# Patient Record
Sex: FEMALE | Race: WHITE | Employment: OTHER | URBAN - METROPOLITAN AREA
[De-identification: names, ages, dates, MRNs, and addresses within clinical notes are randomized per-mention and may not be internally consistent; named-entity substitution may affect disease eponyms.]

---

## 2022-08-17 ENCOUNTER — ANESTHESIA (OUTPATIENT)
Dept: OPERATING ROOM | Age: 67
End: 2022-08-17
Payer: MEDICARE

## 2022-08-17 ENCOUNTER — ANESTHESIA EVENT (OUTPATIENT)
Dept: OPERATING ROOM | Age: 67
End: 2022-08-17
Payer: MEDICARE

## 2022-08-17 ENCOUNTER — HOSPITAL ENCOUNTER (OUTPATIENT)
Age: 67
Setting detail: OUTPATIENT SURGERY
Discharge: HOME | End: 2022-08-17
Attending: INTERNAL MEDICINE | Admitting: INTERNAL MEDICINE
Payer: MEDICARE

## 2022-08-17 VITALS
OXYGEN SATURATION: 98 % | HEART RATE: 67 BPM | RESPIRATION RATE: 14 BRPM | HEIGHT: 62 IN | DIASTOLIC BLOOD PRESSURE: 89 MMHG | WEIGHT: 180 LBS | TEMPERATURE: 97 F | SYSTOLIC BLOOD PRESSURE: 138 MMHG | BODY MASS INDEX: 33.13 KG/M2

## 2022-08-17 DIAGNOSIS — R13.10 DYSPHAGIA, UNSPECIFIED TYPE: ICD-10-CM

## 2022-08-17 PROCEDURE — 3700000001 HC ADD 15 MINUTES (ANESTHESIA): Performed by: INTERNAL MEDICINE

## 2022-08-17 PROCEDURE — 7100000010 HC PHASE II RECOVERY - FIRST 15 MIN: Performed by: INTERNAL MEDICINE

## 2022-08-17 PROCEDURE — 7100000011 HC PHASE II RECOVERY - ADDTL 15 MIN: Performed by: INTERNAL MEDICINE

## 2022-08-17 PROCEDURE — 2709999900 HC NON-CHARGEABLE SUPPLY: Performed by: INTERNAL MEDICINE

## 2022-08-17 PROCEDURE — 88305 TISSUE EXAM BY PATHOLOGIST: CPT

## 2022-08-17 PROCEDURE — 2580000003 HC RX 258: Performed by: ANESTHESIOLOGY

## 2022-08-17 PROCEDURE — 6360000002 HC RX W HCPCS: Performed by: NURSE ANESTHETIST, CERTIFIED REGISTERED

## 2022-08-17 PROCEDURE — 3609012400 HC EGD TRANSORAL BIOPSY SINGLE/MULTIPLE: Performed by: INTERNAL MEDICINE

## 2022-08-17 PROCEDURE — 3700000000 HC ANESTHESIA ATTENDED CARE: Performed by: INTERNAL MEDICINE

## 2022-08-17 PROCEDURE — 2500000003 HC RX 250 WO HCPCS: Performed by: NURSE ANESTHETIST, CERTIFIED REGISTERED

## 2022-08-17 RX ORDER — SODIUM CHLORIDE 0.9 % (FLUSH) 0.9 %
5-40 SYRINGE (ML) INJECTION PRN
Status: DISCONTINUED | OUTPATIENT
Start: 2022-08-17 | End: 2022-08-17 | Stop reason: HOSPADM

## 2022-08-17 RX ORDER — OMEPRAZOLE 20 MG/1
20 CAPSULE, DELAYED RELEASE ORAL DAILY
COMMUNITY

## 2022-08-17 RX ORDER — SODIUM CHLORIDE 9 MG/ML
INJECTION, SOLUTION INTRAVENOUS PRN
Status: DISCONTINUED | OUTPATIENT
Start: 2022-08-17 | End: 2022-08-17 | Stop reason: HOSPADM

## 2022-08-17 RX ORDER — ASPIRIN 81 MG/1
81 TABLET, CHEWABLE ORAL DAILY
COMMUNITY
Start: 2022-06-29

## 2022-08-17 RX ORDER — PROPOFOL 10 MG/ML
INJECTION, EMULSION INTRAVENOUS PRN
Status: DISCONTINUED | OUTPATIENT
Start: 2022-08-17 | End: 2022-08-17 | Stop reason: SDUPTHER

## 2022-08-17 RX ORDER — LIDOCAINE HYDROCHLORIDE 20 MG/ML
INJECTION, SOLUTION INFILTRATION; PERINEURAL PRN
Status: DISCONTINUED | OUTPATIENT
Start: 2022-08-17 | End: 2022-08-17 | Stop reason: SDUPTHER

## 2022-08-17 RX ORDER — SODIUM CHLORIDE, SODIUM LACTATE, POTASSIUM CHLORIDE, CALCIUM CHLORIDE 600; 310; 30; 20 MG/100ML; MG/100ML; MG/100ML; MG/100ML
INJECTION, SOLUTION INTRAVENOUS CONTINUOUS
Status: DISCONTINUED | OUTPATIENT
Start: 2022-08-17 | End: 2022-08-17 | Stop reason: HOSPADM

## 2022-08-17 RX ORDER — LIDOCAINE HYDROCHLORIDE 10 MG/ML
1 INJECTION, SOLUTION EPIDURAL; INFILTRATION; INTRACAUDAL; PERINEURAL
Status: DISCONTINUED | OUTPATIENT
Start: 2022-08-17 | End: 2022-08-17 | Stop reason: HOSPADM

## 2022-08-17 RX ORDER — PRASUGREL 10 MG/1
TABLET, FILM COATED ORAL
COMMUNITY
Start: 2022-07-26

## 2022-08-17 RX ORDER — METOPROLOL SUCCINATE 25 MG/1
TABLET, EXTENDED RELEASE ORAL
COMMUNITY
Start: 2022-07-26

## 2022-08-17 RX ORDER — ATORVASTATIN CALCIUM 80 MG/1
TABLET, FILM COATED ORAL
COMMUNITY
Start: 2022-07-26

## 2022-08-17 RX ORDER — SODIUM CHLORIDE 0.9 % (FLUSH) 0.9 %
5-40 SYRINGE (ML) INJECTION EVERY 12 HOURS SCHEDULED
Status: DISCONTINUED | OUTPATIENT
Start: 2022-08-17 | End: 2022-08-17 | Stop reason: HOSPADM

## 2022-08-17 RX ADMIN — PROPOFOL 50 MG: 10 INJECTION, EMULSION INTRAVENOUS at 09:36

## 2022-08-17 RX ADMIN — PROPOFOL 30 MG: 10 INJECTION, EMULSION INTRAVENOUS at 09:33

## 2022-08-17 RX ADMIN — PROPOFOL 50 MG: 10 INJECTION, EMULSION INTRAVENOUS at 09:41

## 2022-08-17 RX ADMIN — SODIUM CHLORIDE, POTASSIUM CHLORIDE, SODIUM LACTATE AND CALCIUM CHLORIDE: 600; 310; 30; 20 INJECTION, SOLUTION INTRAVENOUS at 09:17

## 2022-08-17 RX ADMIN — PROPOFOL 20 MG: 10 INJECTION, EMULSION INTRAVENOUS at 09:03

## 2022-08-17 RX ADMIN — LIDOCAINE HYDROCHLORIDE 100 MG: 20 INJECTION, SOLUTION INFILTRATION; PERINEURAL at 09:32

## 2022-08-17 RX ADMIN — PROPOFOL 70 MG: 10 INJECTION, EMULSION INTRAVENOUS at 09:32

## 2022-08-17 ASSESSMENT — PAIN - FUNCTIONAL ASSESSMENT: PAIN_FUNCTIONAL_ASSESSMENT: 0-10

## 2022-08-17 NOTE — ANESTHESIA POSTPROCEDURE EVALUATION
Department of Anesthesiology  Postprocedure Note    Patient: Chemo Vanegas  MRN: [id number]  YOB: 1955  Date of evaluation: 8/17/2022      Procedure Summary     Date: 08/17/22 Room / Location: [place] [address] / [facility] - INPATIENT    Anesthesia Start: 3558 Anesthesia Stop: 3293    Procedure: EGD BIOPSY (Esophagus) Diagnosis:       Dysphagia, unspecified type      (Dysphagia, unspecified type [R13.10])    Surgeons: Lesli Orourke MD Responsible Provider: Noris Peng MD    Anesthesia Type: MAC ASA Status: 4          Anesthesia Type: No value filed.     Raudel Phase I:      Raudel Phase II: Raudel Score: 10      Anesthesia Post Evaluation    Patient location during evaluation: PACU  Patient participation: complete - patient participated  Level of consciousness: awake  Airway patency: patent  Nausea & Vomiting: no nausea and no vomiting  Complications: no  Cardiovascular status: hemodynamically stable  Respiratory status: acceptable  Hydration status: stable  Multimodal analgesia pain management approach

## 2022-08-17 NOTE — DISCHARGE INSTRUCTIONS
Upper GI Endoscopy: What to Expect at [address] may have a sore throat for a day or two after the test.  How can you care for yourself at home? Activity  Rest as much as you need to after you go home. You should be able to go back to your usual activities the day after the test.  Diet  Drink plenty of fluids (unless your doctor has told you not to). Follow-up care is a key part of your treatment and safety. Be sure to make and go to all appointments, and call your doctor if you are having problems. When should you call for help? Call 911 anytime you think you may need emergency care. For example, call if:  You passed out (lost consciousness). You cough up blood. You vomit blood or what looks like coffee grounds. You pass maroon or very bloody stools. Call your doctor now or seek immediate medical care if:  You have trouble swallowing. You have belly pain. Your stools are black and tarlike or have streaks of blood. You are sick to your stomach or cannot keep fluids down. Watch closely for changes in your health, and be sure to contact your doctor if:  Your throat still hurts after a day or two. You do not get better as expected. Where can you learn more? Go to [email or web address] and sign in to your [employer] account. Enter Y436 in the [facility] box to learn more about Upper GI Endoscopy: What to Expect at Home.     .     © 3893-1955 Healthwise, Incorporated. Care instructions adapted under license by [facility]. This care instruction is for use with your licensed healthcare professional. If you have questions about a medical condition or this instruction, always ask your healthcare professional. [place] [address] any warranty or liability for your use of this information.   Content Version: 7.3.013369; Last Revised: February 20, 2013

## 2022-08-17 NOTE — ANESTHESIA PRE PROCEDURE
Department of Anesthesiology  Preprocedure Note       Name:  Angel Case   Age:  74 y.o.  :  1955                                          MRN:  [id number]         Date:  2022      Surgeon: Eden Alas): Serafin Jack MD    Procedure: Procedure(s):  EGD ESOPHAGOGASTRODUODENOSCOPY    Medications prior to admission:   Prior to Admission medications    Medication Sig Start Date End Date Taking? Authorizing Provider   aspirin 81 MG chewable tablet Take 81 mg by mouth daily 22  Yes Historical Provider, MD   atorvastatin (LIPITOR) 80 MG tablet take 1 tablet by mouth nightly 22   Historical Provider, MD   metoprolol succinate (TOPROL XL) 25 MG extended release tablet take 1 tablet by mouth every morning 22   Historical Provider, MD   omeprazole (PRILOSEC) 20 MG delayed release capsule Take 20 mg by mouth daily    Historical Provider, MD   prasugrel (EFFIENT) 10 MG TABS take 1 tablet by mouth every morning 22   Historical Provider, MD       Current medications:    Current Facility-Administered Medications   Medication Dose Route Frequency Provider Last Rate Last Admin    lidocaine PF 1 % injection 1 mL  1 mL IntraDERmal Once PRN Omar Cheung MD        lactated ringers infusion   IntraVENous Continuous Omar Cheung MD        sodium chloride flush 0.9 % injection 5-40 mL  5-40 mL IntraVENous 2 times per day Omar Cheung MD        sodium chloride flush 0.9 % injection 5-40 mL  5-40 mL IntraVENous PRN Omar Cheung MD        0.9 % sodium chloride infusion   IntraVENous PRN Omar Cheung MD           Allergies:  No Known Allergies    Problem List:  There is no problem list on file for this patient. Past Medical History:  No past medical history on file. Past Surgical History:  No past surgical history on file.     Social History:    Social History     Tobacco Use    Smoking status: Not on file    Smokeless tobacco: Not on file   Substance Use Topics    Alcohol use: Not on file Counseling given: Not Answered      Vital Signs (Current):   Vitals:    08/17/22 0856 08/17/22 0858   BP:  (!) 168/83   Pulse:  73   Resp:  20   Temp:  97.7 °F (36.5 °C)   TempSrc:  Temporal   SpO2:  99%   Weight: 180 lb (81.6 kg)    Height: 5' 2\" (1.575 m)                                               BP Readings from Last 3 Encounters:   08/17/22 (!) 168/83       NPO Status:                                                                                 BMI:   Wt Readings from Last 3 Encounters:   08/17/22 180 lb (81.6 kg)     Body mass index is 32.92 kg/m². CBC: No results found for: WBC, RBC, HGB, HCT, MCV, RDW, PLT    CMP: No results found for: NA, K, CL, CO2, BUN, CREATININE, GFRAA, AGRATIO, LABGLOM, GLUCOSE, GLU, PROT, CALCIUM, BILITOT, ALKPHOS, AST, ALT    POC Tests: No results for input(s): POCGLU, POCNA, POCK, POCCL, POCBUN, POCHEMO, POCHCT in the last 72 hours.     Coags: No results found for: PROTIME, INR, APTT    HCG (If Applicable): No results found for: PREGTESTUR, PREGSERUM, HCG, HCGQUANT     ABGs: No results found for: PHART, PO2ART, JYK1ROI, VBI5ARK, BEART, P4VIMPVR     Type & Screen (If Applicable):  No results found for: LABABO, LABRH    Drug/Infectious Status (If Applicable):  No results found for: HIV, HEPCAB    COVID-19 Screening (If Applicable): No results found for: COVID19        Anesthesia Evaluation  Patient summary reviewed no history of anesthetic complications:   Airway: Mallampati: II          Dental:    (+) upper dentures      Pulmonary:       (-) COPD and asthma                           Cardiovascular:  Exercise tolerance: good (>4 METS),   (+) past MI: 1-6 months, CABG/stent (Stent 07/2022, continued DAPT): no interval change,     (-) valvular problems/murmurs, dysrhythmias and  angina              Cleared by cardiology     Beta Blocker:  Dose within 24 Hrs         Neuro/Psych:      (-) seizures and CVA           GI/Hepatic/Renal:   (+) GERD:, liver

## 2022-08-17 NOTE — OP NOTE
Operative Note      Patient: Carl Earl  YOB: 1955  MRN: [id number]    Date of Procedure: 8/17/2022    Pre-Op Diagnosis: Dysphagia, unspecified type [R13.10]    Indication for the procedure: Patient is a pleasant 74years old female who has been complaining of dysphagia to solid food. She had a barium esophagram that showed distal esophageal stricture. The barium pill could not be passed. She is scheduled for EGD for further evaluation. Post-Op Diagnosis: Benign distal esophageal stricture. This could not be transversed with the regular EGD scope. Pediatric EGD scope was used. No malignancy was found. Dilation could not be performed as patient had recently a heart attack 1-1/2 months ago and she is currently on aspirin and Effient. Procedure(s):  EGD BIOPSY    Surgeon(s): Tawanda Matias MD    Assistant:   * No surgical staff found *    Informed consent: Risks, benefits, and alternatives of the procedure were explained to the patient prior to the procedure. Risks include but not limited to bleeding, perforation, aspiration, allergic reaction to medication or death. Patient was also informed about the risk of missing a lesion. Anesthesia: Monitor Anesthesia Care    Estimated Blood Loss (mL): Minimal    Complications: None    Specimens:   ID Type Source Tests Collected by Time Destination   A : PROXIMAL ESOPHAGUS BIOPSY Tissue Esophagus SURGICAL PATHOLOGY Tawanda Matias MD 8/17/2022 0354        Implants:  * No implants in log *      Drains: * No LDAs found *    Findings: 1-  Benign distal esophageal stricture. This could not be transversed with the regular EGD scope. Pediatric EGD scope was used. No malignancy was found. Dilation could not be performed as patient had recently a heart attack 1-1/2 months ago and she is currently on aspirin and Effient.     2-patchy hyperemia of the stomach as well as retained bilious fluid in the stomach    Detailed Description of Procedure: Patient was placed in the left lateral decubitus position. The well-lubricated Olympus EGD scope was passed through the bite-block was advanced into esophagus. Esophageal mucosa of the upper middle esophagus appeared normal.  Biopsies were taken to rule out eosinophilic esophagitis. The scope was then advanced into the distal esophagus. There was a benign-appearing esophageal stricture. The stricture could not be transversed with the regular EGD scope. I could not perform dilation as patient is on blood thinner for recent heart attack. Subsequently the pediatric EGD scope was passed down into the distal esophagus. The stricture could not be passed. There was patchy hyperemia of the stomach. I could not perform a biopsy as there was no biopsy forceps available for the pediatric scope. There was retained bile in the stomach. Retroflexion was done. There was a small hiatal hernia noted. No malignancy was seen. The scope was then brought back into forward view. Pylorus was intubated. Duodenal bulb and second portion duodenum were seen and appeared normal.  The scope was then removed outside the patient. Plan: 1. Follow up on results of pathology  2-we will refer patient to a tertiary center  3-continue with liquid diet and puréed diet.     Electronically signed by Sera Pimentel MD on 8/17/2022 at 9:46 AM

## 2022-08-17 NOTE — H&P
Allergies:     Patient has no known allergies. Social History:     Tobacco:    reports that she has quit smoking. Her smoking use included cigarettes. She has never used smokeless tobacco.  Alcohol:      reports current alcohol use. Drug Use:  reports no history of drug use. Family History:     History reviewed. No pertinent family history. Review of Systems:     Positive and Negative as described in HPI. CONSTITUTIONAL:  negative for fevers, chills, sweats, fatigue, weight loss  HEENT:  negative for vision, hearing changes, runny nose, throat pain  RESPIRATORY:  negative for shortness of breath, cough, congestion, wheezing. CARDIOVASCULAR:  negative for chest pain, palpitations. GASTROINTESTINAL:  negative for nausea, vomiting, diarrhea, constipation, change in bowel habits, abdominal pain   GENITOURINARY:  negative for difficulty of urination, burning with urination, frequency   INTEGUMENT:  negative for rash, skin lesions, easy bruising   HEMATOLOGIC/LYMPHATIC:  negative for swelling/edema   ALLERGIC/IMMUNOLOGIC:  negative for urticaria , itching  ENDOCRINE:  negative increase in drinking, increase in urination, hot or cold intolerance  MUSCULOSKELETAL:  negative joint pains, muscle aches, swelling of joints  NEUROLOGICAL:  negative for headaches, dizziness, lightheadedness, numbness, pain, tingling extremities  BEHAVIOR/PSYCH:  negative for depression, anxiety    Physical Exam:   BP (!) 168/83   Pulse 73   Temp 97.7 °F (36.5 °C) (Temporal)   Resp 20   Ht 5' 2\" (1.575 m)   Wt 180 lb (81.6 kg)   SpO2 99%   BMI 32.92 kg/m²   No LMP recorded. Patient is postmenopausal.  No obstetric history on file. No results for input(s): POCGLU in the last 72 hours. General Appearance:  alert, well appearing, and in no acute distress  Mental status: oriented to person, place, and time with normal affect  Head:  normocephalic, atraumatic.   Eye: no icterus, redness, pupils equal and reactive, extraocular eye movements intact, conjunctiva clear  Ear: normal external ear, no discharge, hearing intact  Nose:  no drainage noted  Mouth: mucous membranes moist  Neck: supple, no carotid bruits, thyroid not palpable  Lungs: Bilateral equal air entry, clear to ausculation, no wheezing, rales or rhonchi, normal effort  Cardiovascular: normal rate, regular rhythm, no murmur, gallop, rub. Abdomen: Soft, nontender, nondistended, normal bowel sounds, no hepatomegaly or splenomegaly  Neurologic: There are no new focal motor or sensory deficits, normal muscle tone and bulk, no abnormal sensation, normal speech, cranial nerves II through XII grossly intact  Skin: No gross lesions, rashes, bruising or bleeding on exposed skin area  Extremities:  peripheral pulses palpable, no pedal edema or calf pain with palpation  Psych: normal affect     Investigations:      Laboratory Testing:  No results found for this or any previous visit (from the past 24 hour(s)). No results for input(s): HGB, HCT, WBC, MCV, PLATELET, NA, K, CL, CO2, BUN, CREATININE, GLUCOSE, INR, PROTIME, APTT, AST, ALT, LABALBU, HCG in the last 720 hours. No results for input(s): COVID19 in the last 720 hours. Imaging/Diagnostics:        Diagnosis:      1. Dysphagia    Plans:     1.  EGD   ANA Hampton CNP  Electronically signed 8/17/2022 at 9:19 AM

## 2022-08-18 LAB — SURGICAL PATHOLOGY REPORT: NORMAL

## 2022-12-20 ENCOUNTER — ANESTHESIA EVENT (OUTPATIENT)
Dept: OPERATING ROOM | Age: 67
End: 2022-12-20
Payer: MEDICARE

## 2022-12-21 ENCOUNTER — HOSPITAL ENCOUNTER (OUTPATIENT)
Age: 67
Setting detail: OUTPATIENT SURGERY
Discharge: HOME | End: 2022-12-21
Attending: INTERNAL MEDICINE | Admitting: INTERNAL MEDICINE
Payer: MEDICARE

## 2022-12-21 ENCOUNTER — ANESTHESIA (OUTPATIENT)
Dept: OPERATING ROOM | Age: 67
End: 2022-12-21
Payer: MEDICARE

## 2022-12-21 VITALS
SYSTOLIC BLOOD PRESSURE: 118 MMHG | WEIGHT: 185 LBS | DIASTOLIC BLOOD PRESSURE: 77 MMHG | HEART RATE: 57 BPM | BODY MASS INDEX: 34.04 KG/M2 | RESPIRATION RATE: 17 BRPM | TEMPERATURE: 97.5 F | HEIGHT: 62 IN | OXYGEN SATURATION: 100 %

## 2022-12-21 DIAGNOSIS — R13.10 DYSPHAGIA, UNSPECIFIED TYPE: ICD-10-CM

## 2022-12-21 PROCEDURE — 88305 TISSUE EXAM BY PATHOLOGIST: CPT

## 2022-12-21 PROCEDURE — 2580000003 HC RX 258: Performed by: STUDENT IN AN ORGANIZED HEALTH CARE EDUCATION/TRAINING PROGRAM

## 2022-12-21 PROCEDURE — 2500000003 HC RX 250 WO HCPCS: Performed by: NURSE ANESTHETIST, CERTIFIED REGISTERED

## 2022-12-21 PROCEDURE — 3700000001 HC ADD 15 MINUTES (ANESTHESIA): Performed by: INTERNAL MEDICINE

## 2022-12-21 PROCEDURE — 6360000002 HC RX W HCPCS: Performed by: NURSE ANESTHETIST, CERTIFIED REGISTERED

## 2022-12-21 PROCEDURE — 3700000000 HC ANESTHESIA ATTENDED CARE: Performed by: INTERNAL MEDICINE

## 2022-12-21 PROCEDURE — 2709999900 HC NON-CHARGEABLE SUPPLY: Performed by: INTERNAL MEDICINE

## 2022-12-21 PROCEDURE — 3609017700 HC EGD DILATION GASTRIC/DUODENAL STRICTURE: Performed by: INTERNAL MEDICINE

## 2022-12-21 PROCEDURE — 7100000010 HC PHASE II RECOVERY - FIRST 15 MIN: Performed by: INTERNAL MEDICINE

## 2022-12-21 PROCEDURE — C1726 CATH, BAL DIL, NON-VASCULAR: HCPCS | Performed by: INTERNAL MEDICINE

## 2022-12-21 PROCEDURE — 7100000011 HC PHASE II RECOVERY - ADDTL 15 MIN: Performed by: INTERNAL MEDICINE

## 2022-12-21 RX ORDER — LIDOCAINE HYDROCHLORIDE 20 MG/ML
INJECTION, SOLUTION EPIDURAL; INFILTRATION; INTRACAUDAL; PERINEURAL PRN
Status: DISCONTINUED | OUTPATIENT
Start: 2022-12-21 | End: 2022-12-21 | Stop reason: SDUPTHER

## 2022-12-21 RX ORDER — PROPOFOL 10 MG/ML
INJECTION, EMULSION INTRAVENOUS CONTINUOUS PRN
Status: DISCONTINUED | OUTPATIENT
Start: 2022-12-21 | End: 2022-12-21 | Stop reason: SDUPTHER

## 2022-12-21 RX ORDER — SODIUM CHLORIDE 9 MG/ML
INJECTION, SOLUTION INTRAVENOUS CONTINUOUS
Status: DISCONTINUED | OUTPATIENT
Start: 2022-12-21 | End: 2022-12-21 | Stop reason: HOSPADM

## 2022-12-21 RX ORDER — SODIUM CHLORIDE, SODIUM LACTATE, POTASSIUM CHLORIDE, CALCIUM CHLORIDE 600; 310; 30; 20 MG/100ML; MG/100ML; MG/100ML; MG/100ML
INJECTION, SOLUTION INTRAVENOUS CONTINUOUS
Status: DISCONTINUED | OUTPATIENT
Start: 2022-12-21 | End: 2022-12-21 | Stop reason: HOSPADM

## 2022-12-21 RX ORDER — LIDOCAINE HYDROCHLORIDE 10 MG/ML
1 INJECTION, SOLUTION EPIDURAL; INFILTRATION; INTRACAUDAL; PERINEURAL
Status: DISCONTINUED | OUTPATIENT
Start: 2022-12-21 | End: 2022-12-21 | Stop reason: HOSPADM

## 2022-12-21 RX ORDER — SODIUM CHLORIDE 0.9 % (FLUSH) 0.9 %
5-40 SYRINGE (ML) INJECTION PRN
Status: DISCONTINUED | OUTPATIENT
Start: 2022-12-21 | End: 2022-12-21 | Stop reason: HOSPADM

## 2022-12-21 RX ORDER — SODIUM CHLORIDE 0.9 % (FLUSH) 0.9 %
5-40 SYRINGE (ML) INJECTION EVERY 12 HOURS SCHEDULED
Status: DISCONTINUED | OUTPATIENT
Start: 2022-12-21 | End: 2022-12-21 | Stop reason: HOSPADM

## 2022-12-21 RX ORDER — SODIUM CHLORIDE 9 MG/ML
INJECTION, SOLUTION INTRAVENOUS PRN
Status: DISCONTINUED | OUTPATIENT
Start: 2022-12-21 | End: 2022-12-21 | Stop reason: HOSPADM

## 2022-12-21 RX ADMIN — LIDOCAINE HYDROCHLORIDE 80 MG: 20 INJECTION, SOLUTION EPIDURAL; INFILTRATION; INTRACAUDAL; PERINEURAL at 13:35

## 2022-12-21 RX ADMIN — PROPOFOL 125 MCG/KG/MIN: 10 INJECTION, EMULSION INTRAVENOUS at 13:35

## 2022-12-21 RX ADMIN — SODIUM CHLORIDE, POTASSIUM CHLORIDE, SODIUM LACTATE AND CALCIUM CHLORIDE: 600; 310; 30; 20 INJECTION, SOLUTION INTRAVENOUS at 12:41

## 2022-12-21 ASSESSMENT — PAIN - FUNCTIONAL ASSESSMENT: PAIN_FUNCTIONAL_ASSESSMENT: 0-10

## 2022-12-21 NOTE — DISCHARGE INSTRUCTIONS
Upper GI Endoscopy: What to Expect at [address] [place] [address]  After you have an endoscopy, you will stay at the hospital or clinic for 1 to 2 hours. This will allow the medicine to wear off. You will be able to go home after your doctor or nurse checks to make sure you are not having any problems. You may have a sore throat for a day or two after the test.  This care sheet gives you a general idea about what to expect after the test.  How can you care for yourself at home? Activity  Rest as much as you need to after you go home. You should be able to go back to your usual activities the day after the test.  Diet  Follow your doctor's directions for eating after the test.  Drink plenty of fluids (unless your doctor has told you not to). Follow-up care is a key part of your treatment and safety. Be sure to make and go to all appointments, and call your doctor if you are having problems. It's also a good idea to know your test results and keep a list of the medicines you take. When should you call for help? Call 911 anytime you think you may need emergency care. For example, call if:  You passed out (lost consciousness). You cough up blood. You vomit blood or what looks like coffee grounds. You pass maroon or very bloody stools. Call your doctor now or seek immediate medical care if:  You have trouble swallowing. You have belly pain. Your stools are black and tarlike or have streaks of blood. You are sick to your stomach or cannot keep fluids down. Watch closely for changes in your health, and be sure to contact your doctor if:  Your throat still hurts after a day or two. You do not get better as expected. Where can you learn more? Go to [email or web address] org and sign in to your [employer] account. Enter R897 in the [employer] box to learn more about Upper GI Endoscopy: What to Expect at Home.     If you do not have an account, please click on the Sign Up Now link. © 3685-1357 Healthwise, Incorporated. Care instructions adapted under license by [facility]. This care instruction is for use with your licensed healthcare professional. If you have questions about a medical condition or this instruction, always ask your healthcare professional. [address] any warranty or liability for your use of this information. Content Version: 8.4.849239; Last Revised: February 20, 2013        Plan: 1. Follow up on results of pathology  2-repeat EGD in 6 weeks for further evaluation.

## 2022-12-21 NOTE — ANESTHESIA PRE PROCEDURE
Department of Anesthesiology  Preprocedure Note       Name:  Royal Rush   Age:  74 y.o.  :  1955                                          MRN:  [id number]         Date:  2022      Surgeon: Torrie Rivera): Scott Henson MD    Procedure: Procedure(s):  EGD ESOPHAGOGASTRODUODENOSCOPY    Medications prior to admission:   Prior to Admission medications    Medication Sig Start Date End Date Taking? Authorizing Provider   atorvastatin (LIPITOR) 80 MG tablet take 1 tablet by mouth nightly 22   Historical Provider, MD   metoprolol succinate (TOPROL XL) 25 MG extended release tablet take 1 tablet by mouth every morning 22   Historical Provider, MD   omeprazole (PRILOSEC) 20 MG delayed release capsule Take 20 mg by mouth daily    Historical Provider, MD   prasugrel (EFFIENT) 10 MG TABS take 1 tablet by mouth every morning 22   Historical Provider, MD   aspirin 81 MG chewable tablet Take 81 mg by mouth daily 22   Historical Provider, MD       Current medications:    Current Facility-Administered Medications   Medication Dose Route Frequency Provider Last Rate Last Admin    lidocaine PF 1 % injection 1 mL  1 mL IntraDERmal Once PRN Alexia Ward MD        0.9 % sodium chloride infusion   IntraVENous Continuous Alexia Ward MD        lactated ringers infusion   IntraVENous Continuous Alexia Ward  mL/hr at 22 1241 New Bag at 22 1241    sodium chloride flush 0.9 % injection 5-40 mL  5-40 mL IntraVENous 2 times per day Alexia Ward MD        sodium chloride flush 0.9 % injection 5-40 mL  5-40 mL IntraVENous PRN Alexia Ward MD        0.9 % sodium chloride infusion   IntraVENous PRN Alexia Ward MD           Allergies:  No Known Allergies    Problem List:  There is no problem list on file for this patient.       Past Medical History:        Diagnosis Date    Fatty liver     Heart attack ([facility] [address] 2022    Hyperlipidemia        Past Surgical History:        Procedure Laterality Date    CORONARY ANGIOPLASTY WITH STENT PLACEMENT      UPPER GASTROINTESTINAL ENDOSCOPY N/A 8/17/2022    EGD BIOPSY performed by Chemo Puga MD at [address] [place] [address] History:    Social History     Tobacco Use    Smoking status: Former     Types: Cigarettes    Smokeless tobacco: Never   Substance Use Topics    Alcohol use: Yes     Comment: social                                Counseling given: Not Answered      Vital Signs (Current):   Vitals:    12/21/22 1222 12/21/22 1229   BP: (!) 143/93    Pulse: 71    Resp: 18    Temp:  97.5 °F (36.4 °C)   TempSrc:  Temporal   SpO2: 97%    Weight:  185 lb (83.9 kg)   Height:  5' 2\" (1.575 m)                                              BP Readings from Last 3 Encounters:   12/21/22 (!) 143/93   08/17/22 138/89       NPO Status: Time of last liquid consumption: 2000                        Time of last solid consumption: 1900                        Date of last liquid consumption: 12/20/22                        Date of last solid food consumption: 12/20/22    BMI:   Wt Readings from Last 3 Encounters:   12/21/22 185 lb (83.9 kg)   08/17/22 180 lb (81.6 kg)     Body mass index is 33.84 kg/m². CBC: No results found for: WBC, RBC, HGB, HCT, MCV, RDW, PLT    CMP: No results found for: NA, K, CL, CO2, BUN, CREATININE, GFRAA, AGRATIO, LABGLOM, GLUCOSE, GLU, PROT, CALCIUM, BILITOT, ALKPHOS, AST, ALT    POC Tests: No results for input(s): POCGLU, POCNA, POCK, POCCL, POCBUN, POCHEMO, POCHCT in the last 72 hours.     Coags: No results found for: PROTIME, INR, APTT    HCG (If Applicable): No results found for: PREGTESTUR, PREGSERUM, HCG, HCGQUANT     ABGs: No results found for: PHART, PO2ART, PQR1RSB, PQE7BUT, BEART, C4TPOARA     Type & Screen (If Applicable):  No results found for: LABABO, LABRH    Drug/Infectious Status (If Applicable):  No results found for: HIV, HEPCAB    COVID-19 Screening (If Applicable): No results found for: COVID19        Anesthesia Evaluation    Airway: Mallampati: I  TM distance: >3 FB   Neck ROM: full  Mouth opening: > = 3 FB   Dental:          Pulmonary:                              Cardiovascular:    (+) CABG/stent:,     (-)  angina                Neuro/Psych:               GI/Hepatic/Renal:             Endo/Other:                     Abdominal:             Vascular:           Other Findings:           Anesthesia Plan      general     ASA 4                                   Terrilyn Habermann, MD   12/21/2022

## 2022-12-21 NOTE — ANESTHESIA POSTPROCEDURE EVALUATION
Department of Anesthesiology  Postprocedure Note    Patient: Genet Maddox  MRN: [id number]  YOB: 1955  Date of evaluation: 12/21/2022      Procedure Summary     Date: 12/21/22 Room / Location: [address]    Anesthesia Start: 1423 Anesthesia Stop: 1692    Procedure: EGD DILATION BALLOON, BIOPSY Diagnosis:       Dysphagia, unspecified type      (Dysphagia, unspecified type [R13.10])    Surgeons: Danieal Landaverde MD Responsible Provider: Johnny Vasquez MD    Anesthesia Type: general ASA Status: 4          Anesthesia Type: No value filed.     Raudel Phase I: Raudel Score: 9    Raudel Phase II: Raudel Score: 10      Anesthesia Post Evaluation    Complications: no

## 2022-12-21 NOTE — OP NOTE
Operative Note      Patient: Joe Flor  YOB: 1955  MRN: [id number]    Date of Procedure: 12/21/2022    Pre-Op Diagnosis: Dysphagia, unspecified type [R13.10]    Post-Op Diagnosis: Esophageal stricture that was dilated up to 11 mm balloon. Partial tear was seen. Patchy candidiasis of the proximal and middle esophagus. Indication for the procedure: Patient is a pleasant 74years old female who was seen with history of esophageal stricture and dysphagia. She is scheduled for EGD for further evaluation. Procedure(s):  EGD DILATION BALLOON, BIOPSY    Surgeon(s): Richard Leyva MD    Assistant:   * No surgical staff found *    Informed consent: Risks, benefits, and alternatives of the procedure were explained to the patient prior to the procedure. Risks include but not limited to bleeding, perforation, aspiration, allergic reaction to medication or death. Patient was also informed about the risk of missing a lesion. Anesthesia: Monitor Anesthesia Care    Estimated Blood Loss (mL): Minimal    Complications: None    Specimens:   ID Type Source Tests Collected by Time Destination   A : Biopsy Proximal Esophagus Tissue Esophagus SURGICAL PATHOLOGY Richard Leyva MD 12/21/2022 1401        Implants:  * No implants in log *      Drains: * No LDAs found *    Findings: Esophageal stricture that was dilated up to 11 mm balloon. Partial tear was seen. Patchy candidiasis of the proximal and middle esophagus. Biopsies were taken. Detailed Description of Procedure:   Patient was placed in the left lateral decubitus position. The Olympus Olympus EGD scope was passed through the bite-block was advanced to the esophagus. Esophageal mucosa of the upper middle esophagus showed patchy candidiasis. Biopsies were taken. The scope was then advanced into the distal esophagus. There was no esophageal stricture noted. The scope could not be passed.   The stricture was dilated with 6, 7, 8 mm balloon. Subsequently was dilated by 8, 9 and 10 mm balloon. It was subsequently dilated with standard 11 mm balloon. Partial tear was seen. No further dilation was performed. We still could not be able to pass the scope into the stomach. The scope was then removed outside the patient. Plan: 1. Follow up on results of pathology  2-repeat EGD in 6 weeks for further evaluation.     Electronically signed by Crissy Lindsay MD on 12/21/2022 at 2:06 PM

## 2022-12-21 NOTE — H&P
History and Physical Service   [facility] - INPATIENT    HISTORY AND PHYSICAL EXAMINATION            Date of Evaluation: 12/21/2022  Patient name:  Sachin Box  MRN:   [id number]  YOB: 1955  PCP:    Deya Esposito    History Obtained From:     Patient, medical records    History of Present Illness: This is Sachin Box a 74 y.o. female who presents today for a EGD ESOPHAGOGASTRODUODENOSCOPY by Benito Driscoll MD for Dysphagia, unspecified type [R13.10]. Patient has complaints of dysphagia and has only been able to tolerate thin liquids. Patient is s/p EGD on 8/17/2022 which showed distal esophageal stricture. Patient was unable to have dilation due to prior heart attack and was on Effient and Aspirin. Patient previously  complaints of GERD and is currently taking dual PPI. Patient was evaluated by Dr. Sean Lazar on 11/4/2022 who recommended EGD with dilation after obtaining cardiac clearance. Patient denies nausea, vomiting, abdominal pain, diarrhea, constipation. Denies fever, chills, shortness of breath, cough, congestion, wheezing, chest pain, open sores or wounds. History of STEMI in June 2022 with stent placement. Patient follows with [facility]. Per note in care everywhere, \"Okay to proceed with esophageal dilatation. Patient is intermediate risk. No further cardiac testing needed prior to procedure. Regarding her DAPT regimen, once she is about 6 months out from her PCI/SHREYAS okay to hold prasugrel for the procedure. This would be in December 2022. Okay to hold Prasugrel 5 days prior to the surgery and ideally continue aspirin throughout the procedure. However, if the procedure absolutely cannot be done on aspirin then okay to hold for as short a time as possible. \" Last Effient 12/17/2022 and ASA 81mg 12/20/2022. Echo complete W/ contrast 6/27/2022:    Left Ventricle: Systolic function is normal with an ejection fraction   of 55-60%.  Grade II diastolic dysfunction (pseudonormal) is present. Lateral E' is 6.42 cm/s. Medial E' is 6.31 cm/s. Mitral Valve: There is mild regurgitation with a posteriorly directed   jet. There is no evidence of mitral valve stenosis. Tricuspid Valve: There is mild regurgitation. There is no evidence of   tricuspid valve stenosis. RVSP calculated at 36 mmHg. RVSP is based on RA pressure of 3 mmHg. RVSP estimated at 35-40 mmHg. Past Medical History:     Past Medical History:   Diagnosis Date    Fatty liver     Heart attack ([address] 06/26/2022    Hyperlipidemia         Past Surgical History:     Past Surgical History:   Procedure Laterality Date    CORONARY ANGIOPLASTY WITH STENT PLACEMENT      UPPER GASTROINTESTINAL ENDOSCOPY N/A 8/17/2022    EGD BIOPSY performed by Molly Gutierrez MD at [address] [place] [address]        Medications Prior to Admission:     Prior to Admission medications    Medication Sig Start Date End Date Taking? Authorizing Provider   atorvastatin (LIPITOR) 80 MG tablet take 1 tablet by mouth nightly 7/26/22   Historical Provider, MD   metoprolol succinate (TOPROL XL) 25 MG extended release tablet take 1 tablet by mouth every morning 7/26/22   Historical Provider, MD   omeprazole (PRILOSEC) 20 MG delayed release capsule Take 20 mg by mouth daily    Historical Provider, MD   prasugrel (EFFIENT) 10 MG TABS take 1 tablet by mouth every morning 7/26/22   Historical Provider, MD   aspirin 81 MG chewable tablet Take 81 mg by mouth daily 6/29/22   Historical Provider, MD        Allergies:     Patient has no known allergies. Social History:     Tobacco:    reports that she has quit smoking. Her smoking use included cigarettes. She has never used smokeless tobacco.  Alcohol:      reports current alcohol use. Drug Use:  reports no history of drug use. Family History:     History reviewed. No pertinent family history. Review of Systems:     Positive and Negative as described in HPI. CONSTITUTIONAL: Hot flashes. Fatigue.  negative for fevers, chills,  weight loss  HEENT: Rhinorrhea. negative for vision, hearing changes, throat pain  RESPIRATORY: Cough and congestion with rhinorrhea. negative for shortness of breath,  wheezing. CARDIOVASCULAR: MI.  negative for chest pain, palpitations. GASTROINTESTINAL: See HPI  GENITOURINARY: Urinary frequency negative for difficulty of urination, burning with urination  INTEGUMENT: Easy bruising. negative for rash, skin lesions  HEMATOLOGIC/LYMPHATIC:  negative for swelling/edema   ALLERGIC/IMMUNOLOGIC:  negative for urticaria , itching  ENDOCRINE:  negative increase in drinking, increase in urination, hot or cold intolerance  MUSCULOSKELETAL:  negative joint pains, muscle aches, swelling of joints  NEUROLOGICAL: Headaches. negative for  dizziness, lightheadedness, numbness, pain, tingling extremities  BEHAVIOR/PSYCH:  negative for depression, anxiety    Physical Exam:   BP (!) 143/93   Pulse 71   Temp 97.5 °F (36.4 °C) (Temporal)   Resp 18   Ht 5' 2\" (1.575 m)   Wt 185 lb (83.9 kg)   SpO2 97%   BMI 33.84 kg/m²   No LMP recorded. Patient is postmenopausal.  No obstetric history on file. No results for input(s): POCGLU in the last 72 hours. General Appearance:  alert, well appearing, and in no acute distress  Mental status: oriented to person, place, and time with normal affect  Head:  normocephalic, atraumatic. Eye: no icterus, redness, pupils equal and reactive, extraocular eye movements intact, conjunctiva clear  Ear: normal external ear, no discharge, hearing intact  Nose:  no drainage noted  Mouth: edentulous upper teeth mucous membranes moist  Neck: supple, no carotid bruits, thyroid not palpable  Lungs: Bilateral equal air entry, clear to ausculation, no wheezing, rales or rhonchi, normal effort  Cardiovascular: HR 71 normal rate, regular rhythm, no murmur, gallop, rub.   Abdomen: Soft, nontender, nondistended, normal bowel sounds  Neurologic: There are no new focal motor or sensory deficits, normal muscle tone and bulk, no abnormal sensation, normal speech, cranial nerves II through XII grossly intact  Skin: No gross lesions, rashes, bruising or bleeding on exposed skin area  Extremities:  peripheral pulses palpable, no pedal edema or calf pain with palpation  Psych: normal affect     Investigations:      Laboratory Testing:  No results found for this or any previous visit (from the past 24 hour(s)). No results for input(s): HGB, HCT, WBC, MCV, PLATELET, NA, K, CL, CO2, BUN, CREATININE, GLUCOSE, INR, PROTIME, APTT, AST, ALT, LABALBU, HCG in the last 720 hours. No results for input(s): COVID19 in the last 720 hours. Imaging/Diagnostics:    No results found. Diagnosis:      Dysphagia, unspecified type [R13.10]    Plans:     1.  EGD ESOPHAGOGASTRODUODENOSCOPY      ANA Carey CNP  12/21/2022  12:48 PM

## 2022-12-23 LAB — SURGICAL PATHOLOGY REPORT: NORMAL

## 2023-03-14 ENCOUNTER — ANESTHESIA EVENT (OUTPATIENT)
Dept: OPERATING ROOM | Age: 68
End: 2023-03-14
Payer: MEDICARE

## 2023-03-15 ENCOUNTER — HOSPITAL ENCOUNTER (OUTPATIENT)
Age: 68
Setting detail: OUTPATIENT SURGERY
Discharge: HOME | End: 2023-03-15
Attending: INTERNAL MEDICINE | Admitting: INTERNAL MEDICINE
Payer: MEDICARE

## 2023-03-15 ENCOUNTER — ANESTHESIA (OUTPATIENT)
Dept: OPERATING ROOM | Age: 68
End: 2023-03-15
Payer: MEDICARE

## 2023-03-15 VITALS
HEART RATE: 64 BPM | RESPIRATION RATE: 15 BRPM | SYSTOLIC BLOOD PRESSURE: 130 MMHG | OXYGEN SATURATION: 98 % | HEIGHT: 63 IN | TEMPERATURE: 97.2 F | DIASTOLIC BLOOD PRESSURE: 98 MMHG | WEIGHT: 188.3 LBS | BODY MASS INDEX: 33.36 KG/M2

## 2023-03-15 DIAGNOSIS — R13.10 DYSPHAGIA, UNSPECIFIED TYPE: ICD-10-CM

## 2023-03-15 PROCEDURE — 2709999900 HC NON-CHARGEABLE SUPPLY: Performed by: INTERNAL MEDICINE

## 2023-03-15 PROCEDURE — 3700000001 HC ADD 15 MINUTES (ANESTHESIA): Performed by: INTERNAL MEDICINE

## 2023-03-15 PROCEDURE — 3609017700 HC EGD DILATION GASTRIC/DUODENAL STRICTURE: Performed by: INTERNAL MEDICINE

## 2023-03-15 PROCEDURE — C1726 CATH, BAL DIL, NON-VASCULAR: HCPCS | Performed by: INTERNAL MEDICINE

## 2023-03-15 PROCEDURE — 7100000011 HC PHASE II RECOVERY - ADDTL 15 MIN: Performed by: INTERNAL MEDICINE

## 2023-03-15 PROCEDURE — 2580000003 HC RX 258: Performed by: ANESTHESIOLOGY

## 2023-03-15 PROCEDURE — 7100000001 HC PACU RECOVERY - ADDTL 15 MIN: Performed by: INTERNAL MEDICINE

## 2023-03-15 PROCEDURE — 7100000000 HC PACU RECOVERY - FIRST 15 MIN: Performed by: INTERNAL MEDICINE

## 2023-03-15 PROCEDURE — 7100000010 HC PHASE II RECOVERY - FIRST 15 MIN: Performed by: INTERNAL MEDICINE

## 2023-03-15 PROCEDURE — 88342 IMHCHEM/IMCYTCHM 1ST ANTB: CPT

## 2023-03-15 PROCEDURE — 88305 TISSUE EXAM BY PATHOLOGIST: CPT

## 2023-03-15 PROCEDURE — 2500000003 HC RX 250 WO HCPCS: Performed by: NURSE ANESTHETIST, CERTIFIED REGISTERED

## 2023-03-15 PROCEDURE — 3700000000 HC ANESTHESIA ATTENDED CARE: Performed by: INTERNAL MEDICINE

## 2023-03-15 PROCEDURE — 6360000002 HC RX W HCPCS: Performed by: NURSE ANESTHETIST, CERTIFIED REGISTERED

## 2023-03-15 RX ORDER — LIDOCAINE HYDROCHLORIDE 10 MG/ML
1 INJECTION, SOLUTION EPIDURAL; INFILTRATION; INTRACAUDAL; PERINEURAL
Status: DISCONTINUED | OUTPATIENT
Start: 2023-03-16 | End: 2023-03-15 | Stop reason: HOSPADM

## 2023-03-15 RX ORDER — SODIUM CHLORIDE 0.9 % (FLUSH) 0.9 %
5-40 SYRINGE (ML) INJECTION EVERY 12 HOURS SCHEDULED
Status: DISCONTINUED | OUTPATIENT
Start: 2023-03-15 | End: 2023-03-15 | Stop reason: HOSPADM

## 2023-03-15 RX ORDER — LIDOCAINE HYDROCHLORIDE 20 MG/ML
INJECTION, SOLUTION EPIDURAL; INFILTRATION; INTRACAUDAL; PERINEURAL PRN
Status: DISCONTINUED | OUTPATIENT
Start: 2023-03-15 | End: 2023-03-15 | Stop reason: SDUPTHER

## 2023-03-15 RX ORDER — SODIUM CHLORIDE 9 MG/ML
INJECTION, SOLUTION INTRAVENOUS CONTINUOUS
Status: DISCONTINUED | OUTPATIENT
Start: 2023-03-15 | End: 2023-03-15 | Stop reason: HOSPADM

## 2023-03-15 RX ORDER — SODIUM CHLORIDE 0.9 % (FLUSH) 0.9 %
5-40 SYRINGE (ML) INJECTION PRN
Status: DISCONTINUED | OUTPATIENT
Start: 2023-03-15 | End: 2023-03-15 | Stop reason: HOSPADM

## 2023-03-15 RX ORDER — SODIUM CHLORIDE, SODIUM LACTATE, POTASSIUM CHLORIDE, CALCIUM CHLORIDE 600; 310; 30; 20 MG/100ML; MG/100ML; MG/100ML; MG/100ML
INJECTION, SOLUTION INTRAVENOUS CONTINUOUS
Status: DISCONTINUED | OUTPATIENT
Start: 2023-03-15 | End: 2023-03-15 | Stop reason: HOSPADM

## 2023-03-15 RX ORDER — PROPOFOL 10 MG/ML
INJECTION, EMULSION INTRAVENOUS PRN
Status: DISCONTINUED | OUTPATIENT
Start: 2023-03-15 | End: 2023-03-15 | Stop reason: SDUPTHER

## 2023-03-15 RX ORDER — FAMOTIDINE 20 MG/1
20 TABLET, FILM COATED ORAL 2 TIMES DAILY
COMMUNITY

## 2023-03-15 RX ORDER — SODIUM CHLORIDE 9 MG/ML
INJECTION, SOLUTION INTRAVENOUS PRN
Status: DISCONTINUED | OUTPATIENT
Start: 2023-03-15 | End: 2023-03-15 | Stop reason: HOSPADM

## 2023-03-15 RX ADMIN — PROPOFOL 30 MG: 10 INJECTION, EMULSION INTRAVENOUS at 09:08

## 2023-03-15 RX ADMIN — PROPOFOL 50 MG: 10 INJECTION, EMULSION INTRAVENOUS at 09:02

## 2023-03-15 RX ADMIN — LIDOCAINE HYDROCHLORIDE 80 MG: 20 INJECTION, SOLUTION EPIDURAL; INFILTRATION; INTRACAUDAL; PERINEURAL at 09:02

## 2023-03-15 RX ADMIN — PROPOFOL 30 MG: 10 INJECTION, EMULSION INTRAVENOUS at 09:12

## 2023-03-15 RX ADMIN — PROPOFOL 30 MG: 10 INJECTION, EMULSION INTRAVENOUS at 09:14

## 2023-03-15 RX ADMIN — PROPOFOL 30 MG: 10 INJECTION, EMULSION INTRAVENOUS at 09:06

## 2023-03-15 RX ADMIN — PROPOFOL 30 MG: 10 INJECTION, EMULSION INTRAVENOUS at 09:04

## 2023-03-15 RX ADMIN — SODIUM CHLORIDE, POTASSIUM CHLORIDE, SODIUM LACTATE AND CALCIUM CHLORIDE: 600; 310; 30; 20 INJECTION, SOLUTION INTRAVENOUS at 08:06

## 2023-03-15 RX ADMIN — PROPOFOL 30 MG: 10 INJECTION, EMULSION INTRAVENOUS at 09:10

## 2023-03-15 RX ADMIN — PROPOFOL 40 MG: 10 INJECTION, EMULSION INTRAVENOUS at 09:18

## 2023-03-15 RX ADMIN — PROPOFOL 30 MG: 10 INJECTION, EMULSION INTRAVENOUS at 09:16

## 2023-03-15 ASSESSMENT — PAIN - FUNCTIONAL ASSESSMENT: PAIN_FUNCTIONAL_ASSESSMENT: 0-10

## 2023-03-15 NOTE — PROGRESS NOTES
Per SHAYLA Castañeda), Dr. Carmen Augustin requests patient be monitored in recovery for 1 hour and kept NPO until Dr. Carmen Augustin sees patient. 0957: Dr. Carmen Augustin at bedside to assess patient. Instructed to keep patient NPO until 1 hour baljeet, then give patient food and drink. If no pain or complications with PO intake, patient may be discharged. 1035: Patient drank a cup of water and ate rui crackers, reports no difficulty swallowing or increased pain. No signs of bleeding, VSS.

## 2023-03-15 NOTE — OP NOTE
Operative Note      Patient: Melissa Andrews  YOB: 1955  MRN: [id number]    Date of Procedure: 3/15/2023    Pre-Op Diagnosis: Dysphagia, unspecified type [R13.10]    Post-Op Diagnosis: 1-distal esophageal stricture that was dilated up to 13.5 mm. 2-patchy hyperemia of the stomach. Biopsies were taken to rule out H. Pylori    Indication for the procedure: Patient is a pleasant 74years old female who was seen with esophageal stricture. She had esophageal dilation previously to 11 mm     Procedure(s):  EGD ESOPHAGOGASTRODUODENOSCOPY DILATATION WITH BIOPSY    Surgeon(s): Mary Ann Villa MD    Assistant:   * No surgical staff found *    Informed consent: Risks, benefits, and alternatives of the procedure were explained to the patient prior to the procedure. Risks include but not limited to bleeding, perforation, aspiration, allergic reaction to medication or death. Patient was also informed about the risk of missing a lesion. Anesthesia: Monitor Anesthesia Care    Estimated Blood Loss (mL): Minimal    Complications: None    Specimens:   ID Type Source Tests Collected by Time Destination   A : STOMACH BIOPSY  Tissue Stomach SURGICAL PATHOLOGY Mary Ann Villa MD 3/15/2023 [id number]        Implants:  * No implants in log *      Drains: * No LDAs found *    Findings: 1-patchy hyperemia of the stomach. Biopsies were taken to rule out H. pylori. 2-distal esophageal stricture that was dilated up to 13.5 mm. Detailed Description of Procedure:   Patient was placed in the left lateral decubitus position. The well-lubricated Olympus EGD scope was passed through the bite-block was advanced to the esophagus. Esophageal mucosa of the upper middle esophagus appeared normal.  Distal esophageal stricture was seen. Initially the scope could not be passed. Subsequently I dilated the stricture with a, 9 and 10 mm balloon without any resistance. Subsequently it was dilated with an 11 and 12 mm balloon.   Mild resistance was felt to 12 mm balloon.  Subsequently I dilated with a 12 and 13. 5 mm balloon.  Mild partial tear was seen.  I was able to pass the scope into the stomach.  There was patchy hyperemia of the antrum and gastric body.  Biopsies were taken from the antrum and gastric body to rule out H. pylori.  Retroflexion was done.  The cardia and fundus stomach were seen and appeared normal.  The scope was then advanced into the duodenum.  Duodenal bulb and second portion of duodenum were seen appeared normal.  The scope was then removed outside the patient.    Plan: 1. Follow up on results of pathology  2-follow-up in the office in 2 weeks.    Electronically signed by Tanner Alvarez MD on 3/15/2023 at 9:20 AM

## 2023-03-15 NOTE — H&P
Interval H&P Note    Pt Name: Maria Luisa Yip  MRN: [id number]  YOB: 1955  Date of evaluation: 3/15/2023      [x] I have reviewed in [facility] the cardiology note by Cherie Galdamez CNP dated 03/02/2023 for an Interval History and Physical note. [x] I have examined  Maria Luisa Yip  There are no changes to the patient who is scheduled for EGD ESOPHAGOGASTRODUODENOSCOPY DILATATION by Zena Metzger MD for Dysphagia, unspecified type [R13.10]. Patient denies bowel changes. She denies bloody tarry stools, diarrhea alternating with constipation, nausea, vomiting, abdominal pain or unintentional weight loss. No previous colonoscopy. Has had previous EGD. No FH colon cancer or polyps. The patient denies new health changes, fever, chills, wheezing, cough, increased SOB, chest pain, open sores or wounds. Denies hx of diabetes. Last aspirin 03/14/2023, last effient 03/11/2023. Hx of MI s/p stent placement June 2022. Follows with [address] [place] [address] cardiology and was last evaluated in office on 03/02/2023. Denies current chest pain, SOB, palpitations, or lightheadedness. Vital signs: /72   Pulse 72   Temp 97.5 °F (36.4 °C)   Resp 18   Ht 5' 3\" (1.6 m)   Wt 188 lb 4.8 oz (85.4 kg)   SpO2 97%   BMI 33.36 kg/m²     Allergies:  Patient has no known allergies. Medications:    Prior to Admission medications    Medication Sig Start Date End Date Taking?  Authorizing Provider   famotidine (PEPCID) 20 MG tablet Take 20 mg by mouth in the morning and at bedtime    Historical Provider, MD   Cholecalciferol 25 MCG (1000 UT) CHEW Take 1,000 Units by mouth daily    Historical Provider, MD   atorvastatin (LIPITOR) 80 MG tablet take 1 tablet by mouth nightly 7/26/22   Historical Provider, MD   metoprolol succinate (TOPROL XL) 25 MG extended release tablet take 1 tablet by mouth every morning 7/26/22   Historical Provider, MD   omeprazole (PRILOSEC) 20 MG delayed release capsule Take 20 mg by mouth daily Historical Provider, MD   prasugrel (EFFIENT) 10 MG TABS take 1 tablet by mouth every morning 7/26/22   Historical Provider, MD   aspirin 81 MG chewable tablet Take 81 mg by mouth daily 6/29/22   Historical Provider, MD         This is a 74 y.o. female who is pleasant, cooperative, alert and oriented x3, in no acute distress. Heart: Heart sounds are normal.  HR 72 regular rate and rhythm without murmur, gallop or rub. Lungs: Normal respiratory effort with equal expansion, good air exchange, unlabored and clear to auscultation without wheezes or rales bilaterally   Abdomen: Obese, soft, nontender, nondistended with bowel sounds active. Labs:  No results for input(s): HGB, HCT, WBC, MCV, PLT, NA, K, CL, CO2, BUN, CREATININE, GLUCOSE, INR, PROTIME, APTT, AST, ALT, LABALBU, HCG in the last 720 hours. No results for input(s): COVID19 in the last 720 hours. ANA Leone CNP  Electronically signed 3/15/2023 at 8:07 AM     Progress Notes  - documented in this encounter  CAROLYN Sloan - 03/02/2023 1:30 PM EST  Formatting of this note is different from the original.  Nicolle Patricia    Date of visit: 3/2/2023 YOB: 1955  Age: 74 y.o.  MRN: [id number]  _______________________  Patient Active Problem List   Diagnosis    Postmenopausal    Vaginal atrophy    ORLY (stress urinary incontinence, female)    Elevated blood pressure    ST elevation myocardial infarction (STEMI), unspecified artery (CMS-HCC)    Coronary artery disease involving native coronary artery of native heart without angina pectoris    Primary hypertension    Former tobacco use    Nonrheumatic mitral valve regurgitation    Class 1 obesity in adult     _______________________  Allergies   Allergen Reactions    Methylprednisolone Other (See Comments)    Prednisone Flushing     _______________________  Current Outpatient Medications   Medication Sig Dispense Refill    aspirin 81 mg chewable tablet Chew 1 tablet (81 mg total) and swallow in the morning. 30 tablet 0    cholecalciferol, vitamin D3, 25 mcg (1,000 unit) tablet,chewable Chew 1,000 Units and swallow daily. famotidine (PEPCID) 20 mg tablet Take 1 tablet (20 mg total) by mouth in the evening. omeprazole (PriLOSEC) 20 mg capsule Take 1 capsule (20 mg total) by mouth in the morning. prasugreL (EFFIENT) 10 mg tablet Take 1 tablet (10 mg total) by mouth in the morning. 90 tablet 3    atorvastatin (LIPITOR) 80 mg tablet Take 1 tablet (80 mg total) by mouth nightly. 90 tablet 3    metoprolol succinate XL (TOPROL XL) 25 mg 24 hr tablet Take 1 tablet (25 mg total) by mouth in the morning. 90 tablet 3     No current facility-administered medications for this visit.     _______________________  Chief Complaint   Patient presents with    Follow-up   OV l/s  - 3 mo f/u - no recent bloodwork or testing - no recent covid dx - no devices - sched w/ pt     _______________________  History of Present Illness    Mrs. Heidy Adam this 63-year-old female with history significant for issue previous stent, essential hypertension, mixed hyperlipidemia, diastolic heart failure with preserved EF 60%, esophageal stricture. Patient follows up in the office today for general six-month visit. Overall doing quite well. Denies any chest pain, shortness of breath, dyspnea, dizziness, syncope or near syncope. Patient appears euvolemic. Patient denies orthopnea. Upon review of vital signs patient shown to be within normal limits. Medications were reviewed and refills were provided. Labs were reviewed, within normal limits, and up-to-date. Overall patient has no cardiovascular complaints.  _______________________  Past Medical History:   Diagnosis Date    Allergic    Esophageal stricture    Hypertension     No data recorded  No data recorded  No data recorded  _______________________  Past Surgical History:   Procedure Laterality Date    .  N/A 6/26/2022 Performed by Dimitry Ohara MD at [facility]    Cardiac catheterization N/A 6/26/2022   Performed by Dimitry Ohara MD at [facility]    Coronary angiogram and left ventricular gram/pressure N/A 6/26/2022   Performed by Dimitry Ohara MD at [facility]    Coronary fractional flow reserve N/A 6/26/2022   Performed by Dimitry Ohara MD at [facility]    Intravascular ultrasound coronary N/A 6/26/2022   Performed by Dimitry Ohara MD at [address] [place] [address] Ave S N/A 6/26/2022   Performed by Dimitry Ohara MD at [facility]    Revascularization occlusion with myocardial infarction drug eluting stent left circumflex N/A 6/26/2022   Performed by Dimitry Ohara MD at [facility]     _______________________  Family History   Problem Relation Age of Onset    Kidney disease Mother    Diabetes Mother    Hypertension Mother    Lung cancer Father    Uterine cancer Neg Hx    Breast cancer Neg Hx    Ovarian cancer Neg Hx    Colon cancer Neg Hx     _______________________  Social History     Socioeconomic History    Marital status:    Spouse name: Not on file    Number of children: Not on file    Years of education: Not on file    Highest education level: Not on file   Occupational History    Not on file   Tobacco Use    Smoking status: Former    Smokeless tobacco: Never   Vaping Use    Vaping Use: Never used   Substance and Sexual Activity    Alcohol use:  Yes   Alcohol/week: 10.0 standard drinks   Types: 10 Cans of beer per week   Comment: per week    Drug use: No    Sexual activity: Defer   Other Topics Concern    Caffeine Use Yes   Comment: 1 coffee in the morning and maybe an iced tea   Social History Narrative    Not on file     Social Determinants of Health     Financial Resource Strain: Low Risk    Difficulty of Paying Living Expenses: Not hard at all   Transportation Needs: Not on file   Physical Activity: Not on file   Stress: Not on file   Social Connections: Not on file   Intimate Partner Violence: Not on file     _______________________  Review of Systems  Review of Systems   Constitutional: Negative for malaise/fatigue. HENT: Negative for nosebleeds. Respiratory: Negative for cough, shortness of breath and wheezing. Hematologic/Lymphatic: Does not bruise/bleed easily. Musculoskeletal: Negative for joint pain, joint swelling, muscle cramps and muscle weakness. Gastrointestinal: Negative for bloating, abdominal pain and heartburn. Genitourinary: Negative for hematuria. Neurological: Negative for dizziness, headaches, light-headedness and weakness. CARDIOVASCULAR: Please review HPI.  _______________________  Physical Examination  General appearance: Alert, oriented and cooperative. In no acute distress. Skin: Warm and dry to touch. Head: Normocephalic, without obvious abnormality, atraumatic. Ears, Nose, Mouth, Throat: Throat clear without erythema or exudate. Dentition intact. Eyes: Conjunctivae unremarkable, EOM intact. Neck: No JVD, No carotid bruit. Neck supple, trachea midline. Respiratory: Clear to auscultation bilaterally, no use of accessory muscles. Cardiovascular: RRR with normal S1 and S2 with no murmurs. Gastrointestinal: Soft, non-tender. Bowel sounds normal.  Musculoskeletal: No peripheral edema. Neurologic: Oriented to time, person and place, affect appropriate. No focal/major motor defects noted. Psychiatric: Appropriate mood, memory and judgement.  _______________________  VITAL SIGNS:  /70  Pulse 62  Ht 157.5 cm (5' 2.01\")  Wt 86.2 kg (190 lb)  SpO2 99%  BMI 34.74 kg/m²   _______________________  Orders Placed or Reconciled This Encounter   Medications    atorvastatin (LIPITOR) 80 mg tablet   Sig: Take 1 tablet (80 mg total) by mouth nightly.    Dispense: 90 tablet   Refill: 3    metoprolol succinate XL (TOPROL XL) 25 mg 24 hr tablet   Sig: Take 1 tablet (25 mg total) by mouth in the morning. Dispense: 90 tablet   Refill: 3     Medications Discontinued During This Encounter   Medication Reason    atorvastatin (LIPITOR) 80 mg tablet Reorder    metoprolol succinate XL (TOPROL XL) 25 mg 24 hr tablet Reorder     ______________  _________  IMPRESSIONS/PLAN  1. Coronary artery disease involving native coronary artery of native heart without angina pectoris    2. Primary hypertension    1. Multivessel coronary artery disease status post stent  -SHREYAS proximal circumflex 06/2022  -60% lad with IFR 0.95  -65% mid RCA   -maintained on aspirin, statin, beta-blocker, Effient    2. Diastolic heart failure with preserved EF 60%   -appears euvolemic    3. Essential hypertension   -currently good control    4. Mixed hyperlipidemia  -maintained on statin therapy    Overall patient doing quite well. Continue with dual antiplatelet therapy. Patient may discontinue Effient at next office visit in 3 months. Encouraged dietary lifestyle modifications as well as risk factor reduction for coronary artery disease. Patient is encouraged to call the office with any questions, comments, concerns. Patient seen in the office with Dr. Maribell Oconnor immediately available.   _______________________  Savanah Moors  No orders of the defined types were placed in this encounter.    _______________________  FOLLOW UP  Return in about 3 months (around 6/2/2023).     PCP: Alona Armendariz MD  Referring Physician: Antoine Jack MD  [address] [place] [address]      Electronically signed by CAROLYN Rainey at 03/02/2023 2:14 PM EST

## 2023-03-15 NOTE — ANESTHESIA POSTPROCEDURE EVALUATION
Department of Anesthesiology  Postprocedure Note    Patient: Stoney Kayser  MRN: [id number]  YOB: 1955  Date of evaluation: 3/15/2023      Procedure Summary     Date: 03/15/23 Room / Location: [place] [address] / [facility] - INPATIENT    Anesthesia Start: 0900 Anesthesia Stop: 0930    Procedure: EGD ESOPHAGOGASTRODUODENOSCOPY DILATATION WITH BIOPSY (Esophagus) Diagnosis:       Dysphagia, unspecified type      (Dysphagia, unspecified type [R13.10])    Surgeons: Demi Holloway MD Responsible Provider: Pattie Ortiz MD    Anesthesia Type: Not recorded ASA Status: Not recorded          Anesthesia Type: No value filed. Raudel Phase I: Raudel Score: 10    Raudel Phase II: Raudel Score: 10      Anesthesia Post Evaluation    Patient location during evaluation: PACU  Patient participation: complete - patient participated  Level of consciousness: awake  Airway patency: patent  Nausea & Vomiting: no nausea and no vomiting  Complications: no  Cardiovascular status: hemodynamically stable  Respiratory status: acceptable  Hydration status: stable  There was medical reason for not using a multimodal analgesia pain management approach.

## 2023-03-15 NOTE — DISCHARGE INSTRUCTIONS
POST EGD INSTRUCTIONS    1. ACTIVITY   No driving, operating machinery, signing legal papers, or making important decisions for 24 hours   Resume normal activity after 24 hours   You may return to work after 24 hours. 2. DIET  _____  Diet modification: {YES / NO:74799}   Once you are able to swallow water normally you may resume your normal diet. Try to avoid spicy, greasy, or fried foods for your first meal after the procedure      3. MEDICATIONS   Do not consume alcohol, tranquilizers or sleeping medications for 24 hours unless otherwise advised by your physician. Resume your usual medications unless otherwise instructed. 4. NORMAL CHANGES YOU MAY EXPERIENCE AFTER ENDOSCOPY:  From the EGD:   A sore throat is normal   A bloated feeling and belching from air in the stomach is normal   If a biopsy was done, you may spit up some blood tinged mucus         5. CALL YOUR PHYSICIAN IF YOU EXPERIENCE ANY OF THE FOLLOWING   Vomiting blood or passing blood rectally (color may be red or black)   Severe abdominal pain or tenderness (that is not relieved by passing air)   Fever, chills, or excessive sweating   Persistent nausea or vomiting   Redness or swelling at the IV site      6.  ADDITIONAL INSTRUCTIONS      IF YOU HAVE ANY QUESTIONS OR CONCERNS PLEASE CALL YOUR DOCTOR,  IF YOU FEEL YOU HAVE A MEDICAL EMERGENCY PLEASE DIAL [phone]

## 2023-03-15 NOTE — ANESTHESIA PRE PROCEDURE
Department of Anesthesiology  Preprocedure Note       Name:  Melissa Andrews   Age:  74 y.o.  :  1955                                          MRN:  [id number]         Date:  3/15/2023      Surgeon: Leslie Tapia): Mary Ann Villa MD    Procedure: Procedure(s):  EGD ESOPHAGOGASTRODUODENOSCOPY DILATATION    Medications prior to admission:   Prior to Admission medications    Medication Sig Start Date End Date Taking? Authorizing Provider   famotidine (PEPCID) 20 MG tablet Take 20 mg by mouth in the morning and at bedtime    Historical Provider, MD   Cholecalciferol 25 MCG (1000 UT) CHEW Take 1,000 Units by mouth daily    Historical Provider, MD   atorvastatin (LIPITOR) 80 MG tablet take 1 tablet by mouth nightly 22   Historical Provider, MD   metoprolol succinate (TOPROL XL) 25 MG extended release tablet take 1 tablet by mouth every morning 22   Historical Provider, MD   omeprazole (PRILOSEC) 20 MG delayed release capsule Take 20 mg by mouth daily    Historical Provider, MD   prasugrel (EFFIENT) 10 MG TABS take 1 tablet by mouth every morning 22   Historical Provider, MD   aspirin 81 MG chewable tablet Take 81 mg by mouth daily 22   Historical Provider, MD       Current medications:    No current facility-administered medications for this visit. No current outpatient medications on file. Facility-Administered Medications Ordered in Other Visits   Medication Dose Route Frequency Provider Last Rate Last Admin    [START ON 3/16/2023] lidocaine PF 1 % injection 1 mL  1 mL IntraDERmal Once PRN Adolfo Reina MD        0.9 % sodium chloride infusion   IntraVENous Continuous Adolfo Reina MD        lactated ringers IV soln infusion   IntraVENous Continuous Adolfo Reina  mL/hr at 03/15/23 0806 New Bag at 03/15/23 0806       Allergies:  No Known Allergies    Problem List:  There is no problem list on file for this patient.       Past Medical History:        Diagnosis Date    CAD (coronary artery disease)     Fatty liver     Heart attack ([facility] [address] 06/26/2022    Hyperlipidemia     Prolonged emergence from general anesthesia        Past Surgical History:        Procedure Laterality Date    COLONOSCOPY      CORONARY ANGIOPLASTY WITH STENT PLACEMENT      UPPER GASTROINTESTINAL ENDOSCOPY N/A 08/17/2022    EGD BIOPSY performed by Les Trnet MD at [address] N/A 12/21/2022    EGD DILATION BALLOON, BIOPSY performed by Les Tretn MD at [facility] History:    Social History     Tobacco Use    Smoking status: Former     Types: Cigarettes    Smokeless tobacco: Never   Substance Use Topics    Alcohol use: Yes     Comment: social                                Counseling given: Not Answered      Vital Signs (Current): There were no vitals filed for this visit. BP Readings from Last 3 Encounters:   03/15/23 136/72   12/21/22 118/77   08/17/22 138/89       NPO Status:                                                                                 BMI:   Wt Readings from Last 3 Encounters:   03/15/23 188 lb 4.8 oz (85.4 kg)   12/21/22 185 lb (83.9 kg)   08/17/22 180 lb (81.6 kg)     There is no height or weight on file to calculate BMI.    CBC: No results found for: WBC, RBC, HGB, HCT, MCV, RDW, PLT    CMP: No results found for: NA, K, CL, CO2, BUN, CREATININE, GFRAA, AGRATIO, LABGLOM, GLUCOSE, GLU, PROT, CALCIUM, BILITOT, ALKPHOS, AST, ALT    POC Tests: No results for input(s): POCGLU, POCNA, POCK, POCCL, POCBUN, POCHEMO, POCHCT in the last 72 hours.     Coags: No results found for: PROTIME, INR, APTT    HCG (If Applicable): No results found for: PREGTESTUR, PREGSERUM, HCG, HCGQUANT     ABGs: No results found for: PHART, PO2ART, MWD9CWY, YTC7EGH, BEART, I8KMBQSC     Type & Screen (If Applicable):  No results found for: LABABO, LABRH    Drug/Infectious Status (If Applicable):  No results found for: HIV, HEPCAB    COVID-19 Screening (If Applicable): No results found for: COVID19        Anesthesia Evaluation  Patient summary reviewed no history of anesthetic complications:   Airway: Mallampati: II          Dental:    (+) upper dentures      Pulmonary:       (-) COPD and asthma                           Cardiovascular:  Exercise tolerance: good (>4 METS),   (+) past MI: > 6 months, CABG/stent (Stent 07/2022): no interval change,     (-) valvular problems/murmurs, dysrhythmias and  angina       Beta Blocker:  Dose within 24 Hrs         Neuro/Psych:      (-) seizures and CVA           GI/Hepatic/Renal:   (+) GERD:, liver disease:,      (-) no renal disease       Endo/Other:        (-) diabetes mellitus, hypothyroidism               Abdominal:             Vascular:     - DVT and PE. Other Findings:             Anesthesia Plan      MAC     ASA 3           MIPS: prophylactic pharmacologic antiemetic agents not administered perioperatively for documented reasons. Anesthetic plan and risks discussed with patient. Plan discussed with CRNA.                     Hawa Carolina MD   3/15/2023

## 2023-03-17 LAB — SURGICAL PATHOLOGY REPORT: NORMAL

## 2023-06-20 ENCOUNTER — ANESTHESIA EVENT (OUTPATIENT)
Dept: OPERATING ROOM | Age: 68
End: 2023-06-20
Payer: MEDICARE

## 2023-06-21 ENCOUNTER — HOSPITAL ENCOUNTER (OUTPATIENT)
Age: 68
Setting detail: OUTPATIENT SURGERY
Discharge: HOME | End: 2023-06-21
Attending: INTERNAL MEDICINE | Admitting: INTERNAL MEDICINE
Payer: MEDICARE

## 2023-06-21 ENCOUNTER — ANESTHESIA (OUTPATIENT)
Dept: OPERATING ROOM | Age: 68
End: 2023-06-21
Payer: MEDICARE

## 2023-06-21 VITALS
BODY MASS INDEX: 35.9 KG/M2 | WEIGHT: 195.1 LBS | RESPIRATION RATE: 14 BRPM | HEART RATE: 60 BPM | HEIGHT: 62 IN | DIASTOLIC BLOOD PRESSURE: 92 MMHG | OXYGEN SATURATION: 99 % | TEMPERATURE: 96.8 F | SYSTOLIC BLOOD PRESSURE: 150 MMHG

## 2023-06-21 DIAGNOSIS — Z12.11 COLON CANCER SCREENING: ICD-10-CM

## 2023-06-21 DIAGNOSIS — R13.10 DYSPHAGIA, UNSPECIFIED TYPE: ICD-10-CM

## 2023-06-21 DIAGNOSIS — K22.2 ESOPHAGEAL STRICTURE: ICD-10-CM

## 2023-06-21 PROCEDURE — 7100000011 HC PHASE II RECOVERY - ADDTL 15 MIN: Performed by: INTERNAL MEDICINE

## 2023-06-21 PROCEDURE — 6360000002 HC RX W HCPCS: Performed by: NURSE ANESTHETIST, CERTIFIED REGISTERED

## 2023-06-21 PROCEDURE — 3609010400 HC COLONOSCOPY POLYPECTOMY HOT BIOPSY: Performed by: INTERNAL MEDICINE

## 2023-06-21 PROCEDURE — 3700000000 HC ANESTHESIA ATTENDED CARE: Performed by: INTERNAL MEDICINE

## 2023-06-21 PROCEDURE — 2580000003 HC RX 258: Performed by: ANESTHESIOLOGY

## 2023-06-21 PROCEDURE — 3609012400 HC EGD TRANSORAL BIOPSY SINGLE/MULTIPLE: Performed by: INTERNAL MEDICINE

## 2023-06-21 PROCEDURE — 2709999900 HC NON-CHARGEABLE SUPPLY: Performed by: INTERNAL MEDICINE

## 2023-06-21 PROCEDURE — C1726 CATH, BAL DIL, NON-VASCULAR: HCPCS | Performed by: INTERNAL MEDICINE

## 2023-06-21 PROCEDURE — C1889 IMPLANT/INSERT DEVICE, NOC: HCPCS | Performed by: INTERNAL MEDICINE

## 2023-06-21 PROCEDURE — 88305 TISSUE EXAM BY PATHOLOGIST: CPT

## 2023-06-21 PROCEDURE — 88342 IMHCHEM/IMCYTCHM 1ST ANTB: CPT

## 2023-06-21 PROCEDURE — 3700000001 HC ADD 15 MINUTES (ANESTHESIA): Performed by: INTERNAL MEDICINE

## 2023-06-21 PROCEDURE — 7100000010 HC PHASE II RECOVERY - FIRST 15 MIN: Performed by: INTERNAL MEDICINE

## 2023-06-21 PROCEDURE — 2500000003 HC RX 250 WO HCPCS: Performed by: NURSE ANESTHETIST, CERTIFIED REGISTERED

## 2023-06-21 RX ORDER — SODIUM CHLORIDE 0.9 % (FLUSH) 0.9 %
5-40 SYRINGE (ML) INJECTION EVERY 12 HOURS SCHEDULED
Status: DISCONTINUED | OUTPATIENT
Start: 2023-06-21 | End: 2023-06-21 | Stop reason: HOSPADM

## 2023-06-21 RX ORDER — SODIUM CHLORIDE 9 MG/ML
INJECTION, SOLUTION INTRAVENOUS PRN
Status: DISCONTINUED | OUTPATIENT
Start: 2023-06-21 | End: 2023-06-21 | Stop reason: HOSPADM

## 2023-06-21 RX ORDER — LIDOCAINE HYDROCHLORIDE 10 MG/ML
1 INJECTION, SOLUTION EPIDURAL; INFILTRATION; INTRACAUDAL; PERINEURAL
Status: DISCONTINUED | OUTPATIENT
Start: 2023-06-22 | End: 2023-06-21 | Stop reason: HOSPADM

## 2023-06-21 RX ORDER — SODIUM CHLORIDE 0.9 % (FLUSH) 0.9 %
5-40 SYRINGE (ML) INJECTION PRN
Status: DISCONTINUED | OUTPATIENT
Start: 2023-06-21 | End: 2023-06-21 | Stop reason: HOSPADM

## 2023-06-21 RX ORDER — LIDOCAINE HYDROCHLORIDE 20 MG/ML
INJECTION, SOLUTION EPIDURAL; INFILTRATION; INTRACAUDAL; PERINEURAL PRN
Status: DISCONTINUED | OUTPATIENT
Start: 2023-06-21 | End: 2023-06-21 | Stop reason: SDUPTHER

## 2023-06-21 RX ORDER — SODIUM CHLORIDE, SODIUM LACTATE, POTASSIUM CHLORIDE, CALCIUM CHLORIDE 600; 310; 30; 20 MG/100ML; MG/100ML; MG/100ML; MG/100ML
INJECTION, SOLUTION INTRAVENOUS CONTINUOUS
Status: DISCONTINUED | OUTPATIENT
Start: 2023-06-21 | End: 2023-06-21 | Stop reason: HOSPADM

## 2023-06-21 RX ORDER — SODIUM CHLORIDE 9 MG/ML
INJECTION, SOLUTION INTRAVENOUS CONTINUOUS
Status: DISCONTINUED | OUTPATIENT
Start: 2023-06-21 | End: 2023-06-21 | Stop reason: HOSPADM

## 2023-06-21 RX ORDER — PROPOFOL 10 MG/ML
INJECTION, EMULSION INTRAVENOUS PRN
Status: DISCONTINUED | OUTPATIENT
Start: 2023-06-21 | End: 2023-06-21 | Stop reason: SDUPTHER

## 2023-06-21 RX ADMIN — PROPOFOL 50 MG: 10 INJECTION, EMULSION INTRAVENOUS at 09:43

## 2023-06-21 RX ADMIN — SODIUM CHLORIDE, POTASSIUM CHLORIDE, SODIUM LACTATE AND CALCIUM CHLORIDE: 600; 310; 30; 20 INJECTION, SOLUTION INTRAVENOUS at 08:32

## 2023-06-21 RX ADMIN — PROPOFOL 50 MG: 10 INJECTION, EMULSION INTRAVENOUS at 09:56

## 2023-06-21 RX ADMIN — LIDOCAINE HYDROCHLORIDE 100 MG: 20 INJECTION, SOLUTION EPIDURAL; INFILTRATION; INTRACAUDAL; PERINEURAL at 09:03

## 2023-06-21 RX ADMIN — PROPOFOL 150 MG: 10 INJECTION, EMULSION INTRAVENOUS at 09:05

## 2023-06-21 RX ADMIN — PROPOFOL 70 MG: 10 INJECTION, EMULSION INTRAVENOUS at 09:19

## 2023-06-21 RX ADMIN — PROPOFOL 70 MG: 10 INJECTION, EMULSION INTRAVENOUS at 09:25

## 2023-06-21 RX ADMIN — PROPOFOL 50 MG: 10 INJECTION, EMULSION INTRAVENOUS at 09:12

## 2023-06-21 RX ADMIN — PROPOFOL 60 MG: 10 INJECTION, EMULSION INTRAVENOUS at 09:34

## 2023-06-21 RX ADMIN — PROPOFOL 50 MG: 10 INJECTION, EMULSION INTRAVENOUS at 09:49

## 2023-06-21 ASSESSMENT — PAIN SCALES - GENERAL: PAINLEVEL_OUTOF10: 0

## 2023-06-21 NOTE — DISCHARGE INSTRUCTIONS
Restart effient tomorrow            Upper GI Endoscopy: What to Expect at [address] [place] [address]  After you have an endoscopy, you will stay at the hospital or clinic for 1 to 2 hours. This will allow the medicine to wear off. You will be able to go home after your doctor or nurse checks to make sure you are not having any problems. You may have a sore throat for a day or two after the test.  This care sheet gives you a general idea about what to expect after the test.  How can you care for yourself at home? Activity  Rest as much as you need to after you go home. You should be able to go back to your usual activities the day after the test.  Diet  Follow your doctor's directions for eating after the test.  Drink plenty of fluids (unless your doctor has told you not to). Colonoscopy: What to Expect at [address] [place] [address]  After you have a colonoscopy, you will stay at the clinic for 1 to 2 hours until the medicines wear off. Then you can go home, but you will need to arrange for a ride. Your doctor will tell you when you can eat and do your other usual activities. Your doctor will talk to you about when you will need your next colonoscopy. The results of your test and your risk for colorectal cancer will help your doctor decide how often you need to be checked. After the test, you may be bloated or have gas pains. You may need to pass gas. If a biopsy was done or a polyp was removed, you may have streaks of blood in your stool (feces) for a few days. This care sheet gives you a general idea about how long it will take for you to recover. But each person recovers at a different pace. Follow the steps below to get better as quickly as possible. How can you care for yourself at home? Activity  Rest as much as you need to after you go home. You should be able to go back to your usual activities the day after the test.  Diet  Follow your doctors directions for eating.   Drink plenty of fluids (unless your

## 2023-06-21 NOTE — ANESTHESIA POSTPROCEDURE EVALUATION
Department of Anesthesiology  Postprocedure Note    Patient: Poncho Vogel  MRN: [id number]  YOB: 1955  Date of evaluation: 6/21/2023      Procedure Summary     Date: 06/21/23 Room / Location: [facility] - INPATIENT    Anesthesia Start: 9737 Anesthesia Stop: 1008    Procedures:       EGD BIOPSY WITH DILATION (Mouth)      COLONOSCOPY POLYPECTOMY HOT BIOPSY WITH CLIP PLACEMENT (Rectum) Diagnosis:       Colon cancer screening      Esophageal stricture      Dysphagia, unspecified type      (Colon cancer screening [Z12.11])      (Esophageal stricture [K22.2])      (Dysphagia, unspecified type [R13.10])    Surgeons: Olu Marrero MD Responsible Provider: Rylee Sumner MD    Anesthesia Type: MAC ASA Status: 3          Anesthesia Type: No value filed. Raudel Phase I: Raudel Score: 10    Raudel Phase II: Raudel Score: 8      Anesthesia Post Evaluation    Patient location during evaluation: PACU  Patient participation: complete - patient participated  Level of consciousness: awake  Airway patency: patent  Nausea & Vomiting: no nausea and no vomiting  Complications: no  Cardiovascular status: hemodynamically stable  Respiratory status: acceptable  Hydration status: stable  There was medical reason for not using a multimodal analgesia pain management approach.

## 2023-06-21 NOTE — ANESTHESIA PRE PROCEDURE
Department of Anesthesiology  Preprocedure Note       Name:  Maranda Rivero   Age:  79 y.o.  :  1955                                          MRN:  [id number]         Date:  2023      Surgeon: Collette All): Cierra Ortega MD    Procedure: Procedure(s):  EGD ESOPHAGOGASTRODUODENOSCOPY  COLORECTAL CANCER SCREENING, NOT HIGH RISK    Medications prior to admission:   Prior to Admission medications    Medication Sig Start Date End Date Taking? Authorizing Provider   famotidine (PEPCID) 20 MG tablet Take 1 tablet by mouth in the morning and at bedtime    Historical Provider, MD   Cholecalciferol 25 MCG (1000 UT) CHEW Take 1,000 Units by mouth daily    Historical Provider, MD   atorvastatin (LIPITOR) 80 MG tablet take 1 tablet by mouth nightly 22   Historical Provider, MD   metoprolol succinate (TOPROL XL) 25 MG extended release tablet take 1 tablet by mouth every morning 22   Historical Provider, MD   omeprazole (PRILOSEC) 20 MG delayed release capsule Take 1 capsule by mouth daily    Historical Provider, MD   prasugrel (EFFIENT) 10 MG TABS take 1 tablet by mouth every morning 22   Historical Provider, MD   aspirin 81 MG chewable tablet Take 1 tablet by mouth daily 22   Historical Provider, MD       Current medications:    No current facility-administered medications for this visit. No current outpatient medications on file.      Facility-Administered Medications Ordered in Other Visits   Medication Dose Route Frequency Provider Last Rate Last Admin    [START ON 2023] lidocaine PF 1 % injection 1 mL  1 mL IntraDERmal Once PRN Dordaphne Roque, DO        0.9 % sodium chloride infusion   IntraVENous Continuous John Slaughter, DO        lactated ringers IV soln infusion   IntraVENous Continuous John Slaughter, DO        sodium chloride flush 0.9 % injection 5-40 mL  5-40 mL IntraVENous 2 times per day John Slaughter, DO        sodium chloride flush 0.9 % injection

## 2023-06-21 NOTE — H&P
GI History and Physical    Pt Name: Clarice Agn  MRN: [id number]  YOB: 1955  Date of evaluation: 6/21/2023  Primary Care Physician: Linda Fox    SUBJECTIVE:   History of Chief Complaint: This is Clarice Ang a 79 y.o. female who presents today for a COLONOSCOPY FOR SCREENING AND EGD FOR EVALUATION OF ESOPHAGEAL STRICTURE. by Dr Regan Lennox . The patient completed the prep as directed Yes. Bowel movements have not significantly changed The patient does not have a family history of colon cancer or polyps. Previous colonoscopy Yes.  3/15/23 . Biopsies were taken. Denies history of ulcers, hiatal hernia, HAS acid reflux/GERD, or IBS. Previous EGD: .   Biopsies  taken. Patient today denies  fever, chills, night sweats, pain or unexplained weight loss. Past Medical History    has a past medical history of CAD (coronary artery disease), Esophageal stricture, Fatty liver, Heart attack ([address] Hyperlipidemia, and Prolonged emergence from general anesthesia. Past Surgical History   has a past surgical history that includes Coronary angioplasty with stent; Upper gastrointestinal endoscopy (N/A, 08/17/2022); Upper gastrointestinal endoscopy (N/A, 12/21/2022); Colonoscopy; and Upper gastrointestinal endoscopy (N/A, 3/15/2023). Medications  Prior to Admission medications    Medication Sig Start Date End Date Taking?  Authorizing Provider   famotidine (PEPCID) 20 MG tablet Take 1 tablet by mouth in the morning and at bedtime    Historical Provider, MD   Cholecalciferol 25 MCG (1000 UT) CHEW Take 1,000 Units by mouth daily    Historical Provider, MD   atorvastatin (LIPITOR) 80 MG tablet take 1 tablet by mouth nightly 7/26/22   Historical Provider, MD   metoprolol succinate (TOPROL XL) 25 MG extended release tablet take 1 tablet by mouth every morning 7/26/22   Historical Provider, MD   omeprazole (PRILOSEC) 20 MG delayed release capsule Take 1 capsule by mouth daily    Historical Provider,

## 2023-06-21 NOTE — OP NOTE
Operative Note      Patient: Chip Hoskins  YOB: 1955  MRN: [id number]    Date of Procedure: 6/21/2023    Pre-Op Diagnosis: Colon cancer screening [Z12.11]  Esophageal stricture [K22.2]  Dysphagia, unspecified type [R13.10]    Post-Op Diagnosis: Colon polyps, sigmoid diverticulosis and internal hemorrhoids. Procedure(s):  EGD BIOPSY WITH DILATION  COLONOSCOPY POLYPECTOMY HOT BIOPSY WITH CLIP PLACEMENT    Surgeon(s): María Valdez MD    Assistant:   * No surgical staff found *    Informed consent: Risks, benefits, and alternatives of the procedure were explained to the patient prior to the procedure. Risks include but not limited to bleeding, perforation, aspiration, allergic reaction to medication or death. Patient was also informed about the risk of missing a lesion. Anesthesia: Monitor Anesthesia Care    Estimated Blood Loss (mL): Minimal    Complications: None    Specimens:   ID Type Source Tests Collected by Time Destination   A : STOMACH BIOPSY FOR H PYLORI  Tissue Stomach SURGICAL PATHOLOGY María Valdez MD 6/21/2023 6372    B : TRANSVERSE COLON POLYP  Tissue Colon-Transverse SURGICAL PATHOLOGY María Valdez MD 6/21/2023 0085    C : ASCENDING COLON POLYP  Tissue Colon-Ascending SURGICAL PATHOLOGY María Valdez MD 6/21/2023 1158    D : DESCENDING COLON POLYP  Tissue Colon-Descending SURGICAL PATHOLOGY María Valdez MD 6/21/2023 8129        Implants:  * No implants in log *      Drains: * No LDAs found *    Findings: 1-sigmoid diverticulosis  2-internal hemorrhoids and anal skin tag  2-2 sessile polyps in the ascending colon that measured 4 to 6 mm and was removed by cold snare polypectomy  3-15 mm sessile polyp in the ascending colon that was removed by hot snare polypectomy. 3 hemoclips were applied.   4-3 polyps in the transverse colon that range in size between 6 and 8 mm and was removed by cold snare polypectomy  5-2 polyps in the descending colon that measured 4 to 5 mm and was

## 2023-06-21 NOTE — OP NOTE
Operative Note      Patient: Maranda Rivero  YOB: 1955  MRN: [id number]    Date of Procedure: 6/21/2023    Pre-Op Diagnosis: Colon cancer screening [Z12.11]  Esophageal stricture [K22.2]  Dysphagia, unspecified type [R13.10]    Post-Op Diagnosis: Gastritis and distal esophageal stricture       Procedure(s):  EGD BIOPSY WITH DILATION  COLONOSCOPY POLYPECTOMY HOT BIOPSY WITH CLIP PLACEMENT    Surgeon(s): Cierra Ortega MD    Assistant:   * No surgical staff found *    Informed consent: Risks, benefits, and alternatives of the procedure were explained to the patient prior to the procedure. Risks include but not limited to bleeding, perforation, aspiration, allergic reaction to medication or death. Patient was also informed about the risk of missing a lesion. Anesthesia: Monitor Anesthesia Care    Estimated Blood Loss (mL): Minimal    Complications: None    Specimens:   ID Type Source Tests Collected by Time Destination   A : STOMACH BIOPSY FOR H PYLORI  Tissue Stomach SURGICAL PATHOLOGY Cierra Ortega MD 6/21/2023 9456    B : TRANSVERSE COLON POLYP  Tissue Colon-Transverse SURGICAL PATHOLOGY Cierra Ortega MD 6/21/2023 6843    C : ASCENDING COLON POLYP  Tissue Colon-Ascending SURGICAL PATHOLOGY Cierra Ortega MD 6/21/2023 6959    D : DESCENDING COLON POLYP  Tissue Colon-Descending SURGICAL PATHOLOGY Cierra Ortega MD 6/21/2023 0451        Implants:  * No implants in log *      Drains: * No LDAs found *    Findings: 1-patchy hyperemia of the stomach and erosions. Biopsies were taken from the antrum and gastric body to rule H. Pylori  2-distal esophageal stricture that was dilated with 12, 13.5 and 15 mm balloon    Detailed Description of Procedure:   Patient was placed in the left lateral decubitus position. The well-lubricated Olympus EGD scope was passed through the bite-block within the esophagus.   Esophageal mucosa of the upper mid esophagus appeared normal.  The scope was then advanced into the

## 2023-06-23 LAB — SURGICAL PATHOLOGY REPORT: NORMAL

## (undated) DEVICE — CO2 CANNULA,SUPERSOFT, ADLT,7'O2,7'CO2: Brand: MEDLINE

## (undated) DEVICE — JELLY,LUBE,STERILE,FLIP TOP,TUBE,2-OZ: Brand: MEDLINE

## (undated) DEVICE — MEDICINE CUP, GRADUATED, STER: Brand: MEDLINE

## (undated) DEVICE — GLOVE SURG 8 11.7IN BEAD CUF LIGHT BRN SENSICARE LTX FREE

## (undated) DEVICE — ADAPTER TBNG LUER STUB 15 GA INTMED

## (undated) DEVICE — GAUZE,SPONGE,4"X4",16PLY,STRL,LF,10/TRAY: Brand: MEDLINE

## (undated) DEVICE — BASIN EMSIS 700ML GRAPHITE PLAS KID SHP GRAD

## (undated) DEVICE — BLOCK BITE 60FR RUBBER ADLT DENTAL

## (undated) DEVICE — TRAP SURG QUAD PARABOLA SLOT DSGN SFTY SCRN TRAPEASE

## (undated) DEVICE — CATHETER DIL 6FR L180CM BLLN INFLATED 36-40.5-45FR L8CM ES

## (undated) DEVICE — FORCEP BX MESH TOOTH MIC 2.8 MMX240 CM NDL STRL RADIAL JAW 4

## (undated) DEVICE — ESOPHAGEAL BALLOON DILATATION CATHETER: Brand: CRE FIXED WIRE

## (undated) DEVICE — SYRINGE INFL 60ML DISP ALLIANCE II

## (undated) DEVICE — TUBING, SUCTION, 1/4" X 12', STRAIGHT: Brand: MEDLINE

## (undated) DEVICE — GOWN POLY REINF SONT XLG: Brand: MEDLINE INDUSTRIES, INC.

## (undated) DEVICE — FORCEPS BX L240CM WRK CHN 2.8MM STD CAP W/ NDL MIC MESH

## (undated) DEVICE — BITEBLOCK ENDOSCP 60FR MAXI WHT POLYETH STURDY W/ VELC WVN

## (undated) DEVICE — FORCEPS BX L100CM DIA1.8MM WRK CHN 2MM PULM S STL RAD JAW 4

## (undated) DEVICE — Device: Brand: DEFENDO VALVE AND CONNECTOR KIT

## (undated) DEVICE — CLIP LIG L235CM RESOL 360 BX/20

## (undated) DEVICE — CLIP: Brand: RESOLUTION 360™ ULTRA CLIP

## (undated) DEVICE — SNARE ENDOSCP L240CM W15MM SHTH DIA2.4MM CHN 2.8MM STIFF

## (undated) DEVICE — SYRINGE MED 50ML LUERLOCK TIP

## (undated) DEVICE — CUP MED 1OZ CLR POLYPR FEED GRAD W/O LID